# Patient Record
Sex: MALE | Race: WHITE | NOT HISPANIC OR LATINO | URBAN - METROPOLITAN AREA
[De-identification: names, ages, dates, MRNs, and addresses within clinical notes are randomized per-mention and may not be internally consistent; named-entity substitution may affect disease eponyms.]

---

## 2021-01-18 ENCOUNTER — EMERGENCY (EMERGENCY)
Facility: HOSPITAL | Age: 61
LOS: 1 days | Discharge: HOME | End: 2021-01-18
Admitting: EMERGENCY MEDICINE
Payer: COMMERCIAL

## 2021-01-18 VITALS
RESPIRATION RATE: 18 BRPM | DIASTOLIC BLOOD PRESSURE: 88 MMHG | OXYGEN SATURATION: 99 % | HEART RATE: 56 BPM | TEMPERATURE: 99 F | SYSTOLIC BLOOD PRESSURE: 151 MMHG

## 2021-01-18 PROCEDURE — 99283 EMERGENCY DEPT VISIT LOW MDM: CPT

## 2021-01-18 NOTE — ED PROVIDER NOTE - OBJECTIVE STATEMENT
59 yo M with no known PMHx, presenting for COVID screening.  Reports no active sx or contact with COVID+ personnels.  Denies fever, chills, cough, SOB, palpitations, HA, dizziness, congestion, rhinorrhea, N/V/D/C, abdomina pain, change in urinary/bowel function, focal weakness, and malaise. No recent travel noted 59 yo M with PMHx of HLD, presenting for COVID screening.  Reports no active sx or contact with COVID+ personnels.  Denies fever, chills, cough, SOB, palpitations, HA, dizziness, congestion, rhinorrhea, N/V/D/C, abdomina pain, change in urinary/bowel function, focal weakness, and malaise. No recent travel noted

## 2021-01-18 NOTE — ED PROVIDER NOTE - PMH
No pertinent past medical history <<----- Click to add NO pertinent Past Medical History HLD (hyperlipidemia)

## 2021-01-18 NOTE — ED PROVIDER NOTE - PATIENT PORTAL LINK FT
You can access the FollowMyHealth Patient Portal offered by [facility] by registering at the following website: [email or web address]. By joining [facility]’s FollowMyHealth portal, you will also be able to view your health information using other applications (apps) compatible with our system.

## 2021-01-22 DIAGNOSIS — Z20.822 CONTACT WITH AND (SUSPECTED) EXPOSURE TO COVID-19: ICD-10-CM
